# Patient Record
(demographics unavailable — no encounter records)

---

## 2018-03-18 NOTE — XMS REPORT
Patient Summary Document

 Created on: 2018



MARIELA BISHOP

External Reference #: [id number]

: 1943

Sex: Female



Demographics







 Address  [address] [place] [address]  [address]

 

 Home Phone  [phone]

 

 Preferred Language  Unknown

 

 Marital Status  Unknown

 

 Christian Affiliation  Unknown

 

 Race  Unknown

 

 Additional Race(s) 

 

 

 Ethnic Group  Unknown





Author







 Author  [facility]

 

 Address  Unknown

 

 Phone  Unavailable







Care Team Providers







 Care Team Member Name  Role  Phone

 

 AJAY DELEON  Unavailable  Unavailable

 

 REYES-GUERRERO, EDNA  Unavailable  Unavailable







Problems

This patient has no known problems.



Allergies, Adverse Reactions, Alerts

This patient has no known allergies or adverse reactions.



Medications

This patient has no known medications.



Results







 Test Description  Test Time  Test Comments  Text Results  Atomic Results  
Result Comments









 ELECTROLYTES  2018 16:35:00       









   

 

 SODIUM (BEAKER) (test code=381)  137 meq/L  136-145   

 

 POTASSIUM (BEAKER) (test code=379)  4.1 meq/L  3.5-5.1  Specimen slightly 
hemolyzed

 

 CHLORIDE (BEAKER) (test code=382)  107 meq/L     

 

 CO2 (BEAKER) (test code=355)  24 meq/L  22-29   





[id number] 16:35:00* 





 Test Item  Value  Reference Range  Comments

 

 GLUCOSE RANDOM (BEAKER) (test code=652)  134 mg/dL     





BUN AND [id number] 16:35:00* 





 Test Item  Value  Reference Range  Comments

 

 BLOOD UREA NITROGEN (BEAKER) (test code=354)  19 mg/dL  7-21   

 

 CREATININE (BEAKER) (test code=358)  0.68 mg/dL  0.57-1.25  Specimen slightly 
hemolyzed

 

 EGFR (BEAKER) (test code=1092)  85 mL/min/1.73 sq m     ESTIMATED GFR IS NOT 
AS ACCURATE AS CREATININE CLEARANCE IN PREDICTING GLOMERULAR FILTRATION RATE. 
ESTIMATED GFR IS NOT APPLICABLE FOR DIALYSIS PATIENTS.





[id number] 16:18:00* 





 Test Item  Value  Reference Range  Comments

 

 HEMOGLOBIN (BEAKER) (test code=410)  12.8 GM/DL  11.2-15.7   





CHEST 2 VIEWS    [facility]   [address] [place], [place] [address]      Patient Name: MARIELA BISHOP   MR #
: [id number]    : 1943 Age/Sex: 74/F  Acct #: [id number] Re #: [id number]
[id number]  [facility] Physician:     Ordered by: REYES-GUERRERO, EDNA DPM  Report #: [id number]
[id number]   Location: OR  Room/Bed:     ____________________________________________
_______________________________________________________    Procedure: [id number] 
DX/CHEST 2 VIEWS  Exam Date: 17                            Exam Time: 
1549       REPORT STATUS: Signed    PROCEDURE: X-RAY CHEST, TWO VIEWS   
COMPARISON: [facility], DX, CHEST 2 VIEWS, 2016,    13:11.   
INDICATIONS: PRE-OP FOOT SURGERY TOMORROW       FINDINGS:         LUNGS: No 
consolidations or edema. The right hemidiaphragm remains    elevated secondary 
to focal eventration anteriorly.          PLEURA: No effusions or pneumothorax. 
Thickening of the minor fissure.       HEART  T     MEDIASTINUM: The heart is 
within normal size-limits. Tortuous thoracic    aorta.       BONES  T    SOFT 
TISSUES:  No acute findings.           CONCLUSION:      No acute thoracic 
abnormality.                Yun Lee D.O.     Dictated by:  Yun Lee D.O. on 2017 at 17:50        Electronically approved by:  Yun Lee D.O. on 2017 at 17:50                   Dictated By: YUN LEE DO  
Electronically Signed By: YUN LEE DO on 17  Transcribed By: 
CHARISSE on 17       COPY TO:   REYES-GUERRERO,EDNA DPM

## 2018-03-18 NOTE — XMS REPORT
Clinical Summary

 Created on: 2018



Mariela Bishop

External Reference #: [id number]

: 1943

Sex: Female



Demographics







 Address  [address] [place]  [address]

 

 Home Phone  [phone]

 

 Preferred Language  Unknown

 

 Marital Status  

 

 Orthodoxy Affiliation  Unknown

 

 Race  White

 

 Ethnic Group  /Latin





Author







 Author  [facility]

 

 Organization  [facility]

 

 Address  Unknown

 

 Phone  Unavailable







Support







 Name  Relationship  Address  Phone

 

 Stevan Bishop  [employer]  [address] [place]  [address]  [phone]







Care Team Providers







 Care Team Member Name  Role  Phone

 

 Asked, Given  PCP  Unavailable







Allergies

No Known Allergies



Current Medications







      



  Prescription   Sig.   Disp.   Refills   Start   End Date   Status



      Date  

 

      



  azithromycin (ZITHROMAX)      20    Active



  250 MG tablet      17  

 

      



  YUVAFEM 10 mcg vaginal      05/10/20    Active



  tablet      17  

 

      



  levothyroxine (SYNTHROID,   TOME 1 TABLETA TODOS LOS    1   20    Active



  LEVOXYL) 112 mcg tablet   VICTOR     17  

 

      



  omeprazole (PriLOSEC) 40      20    Active



  MG capsule      17  

 

      



  terconazole (TERAZOL 7)      20    Active



  0.4 % vaginal cream      17  

 

      



  nitrofurantoin,   Take 100 mg by mouth 2       Active



  macrocrystal-monohydrate,   (two) times a day.     



  (MACROBID) 100 MG capsule      

 

      



  meloxicam (MOBIC) 15 mg      10/19/20    Active



  tablet      17  

 

      



  nitrofurantoin      10/09/20    Active



  (MACRODANTIN) 100 MG      17  



  capsule      

 

      



  triamcinolone (KENALOG)      10/25/20    Active



  0.1 % cream      17  

 

      



  ciprofloxacin (CIPRO) 500   Take 1 tablet (500 mg   14 tablet   0   20   



  MG tablet   total) by mouth 2 (two)     17   17 



   times a day for 7 days.     

 

      



  cefuroxime (CEFTIN) 500   Take 1 tablet (500 mg   14 tablet   0   20   



  MG tabletIndications:   total) by mouth 2 (two)     17   17 



  Acute cystitis without   times a day for 7 days.     



  hematuria      

 

      



  nitrofurantoin,   Take 1 capsule (100 mg   20 capsule   0   20   



  macrocrystal-monohydrate,   total) by mouth 2 (two)     17   17 



  (MACROBID) 100 MG   times a day for 10 days.     



  capsuleIndications: Acute      



  cystitis without      



  hematuria      

 

      



  minocycline (MINOCIN) 100   Take 1 capsule (100 mg   20 capsule   0   20   



  MG capsule   total) by mouth 2 (two)     18   18 



   times a day for 10 days.     

 

      



  nitrofurantoin,   Take 1 capsule (100 mg   14 capsule   0   20   



  macrocrystal-monohydrate,   total) by mouth 2 (two)     18   18 



  (MACROBID) 100 MG   times a day for 7 days.     



  capsuleIndications:      



  Urinary tract infection      



  without hematuria, site      



  unspecified      







Active Problems





Not on file



Encounters







    



  Date   Type   Specialty   Care Team   Description

 

    



  2018   Orders Only   Urology   Shelli Hwang MA   Urinary tract 
infection



      without hematuria, site



      unspecified (Primary Dx)

 

    



  2018   Telephone   Urology   Quinton Goldberg MD 

 

    



  2018   Orders Only   Urology   Shelli Hwang MA   Urinary tract 
infection



      without hematuria, site



      unspecified (Primary Dx)

 

    



  2018   Telephone   Urology   Quinton Goldberg MD 

 

    



  2018   Telephone   Urology   Shelli Hwang MA 

 

    



  2018   Telephone   Urology   Jessica Stone MA 

 

    



  2018   Clinical   Urology   Truong Das MD   Urinary tract 
infection



   Support     without hematuria, site



      unspecified (Primary Dx)

 

    



  2018   Telephone   Urology   Quinton Goldberg MD 

 

    



  2017   Telephone   Urology   Quinton Goldberg MD 

 

    



  2017   Office Visit   Quinton Vega   Urinary tract 
infection



     MD COY   without hematuria, site



      unspecified (Primary Dx)

 

    



  2017   Telephone   UrologQuinton Pizano   Urinary tract 
infection



     MD COY   without hematuria, site



      unspecified (Primary Dx)

 

    



  10/09/2017   Procedure visit   UrologQuinton Pizano   Urinary 
tract infection



     MD COY   without hematuria, site



      unspecified (Primary Dx)

 

    



  10/04/2017   Telephone   Quinton Vega MD 

 

    



  2017   Orders Only   Urology   Shelli Hwang MA   Acute cystitis 
without



      hematuria (Primary Dx)

 

    



  09/15/2017   Telephone   Urology   Therese Alcantara MA   Urinary tract 
infection,



      site unspecified (Primary



      Dx)

 

    



  2017   Telephone   Urology   Quinton Goldberg MD 

 

    



  2017   Telephone   Urology   Quinton Goldberg MD 

 

    



  2017   Telephone   Urology   Quinton Goldberg MD 

 

    



  2017   Office Visit   Urology   Quinton Goldberg   Recurrent UTI 
(Primary



     N., MD   Dx)

 

    



  2017   Telephone   Urology   Therese Alcantara MA   Urinary tract 
infection,



      site unspecified (Primary



      Dx)

 

    



  2017   Office Visit   Urology   Quinton Goldberg   Recurrent UTI 
(Primary



     NTeresa, MD   Dx);



      Atrophic vaginitis

 

    



  2017   Telephone   Urology   Quinton Goldberg   Urinary tract 
infection,



     MD COY   site unspecified (Primary



      Dx)

 

    



  2017   Telephone   Urology   Shelli Hwang MA 

 

    



  2017   Orders Only   Urology   Shelli Hwang MA   Acute cystitis 
without



      hematuria (Primary Dx)

 

    



  2017   Orders Only   Urology   Quinton Goldberg MD 

 

    



  2017   Telephone   Urology   Therese Alcantara MA 

 

    



  2017   Clinical   Urology   Quinton Goldberg   Urinary tract 
infection,



   Support    MD COY   site unspecified (Primary



      Dx)



after 2017



Family History







   



  Relation   Name   Status   Comments

 

   



  Father     

 

   



  Mother     







Social History







    



  Tobacco Use   Types   Packs/Day   Years Used   Date

 

    



  Never Smoker    









   



  Alcohol Use   Drinks/Week   oz/Week   Comments

 

   



  No   









 



  Sex Assigned at Birth   Date Recorded

 

 



  Not on file 







Last Filed Vital Signs

Not on file



Plan of Treatment







   



  Health Maintenance   Due Date   Last Done   Comments

 

   



  COLONOSCOPY   1993  

 

   



  MAMMOGRAM   1993  

 

   



  ZOSTER VACCINE   2003  

 

   



  PNEUMOCOCCAL   2008  



  POLYSACCHARIDE VACCINE   



  AGE 65 AND OVER   

 

   



  PNEUMOCOCCAL-13   2008  

 

   



  INFLUENZA VACCINE   2017  







Results

* Urine culture (2018  1:26 PM)



Only the most recent of 7 results within the time period is included.





  



  Component   Value   Ref Range

 

  



  Urine culture   No growth 









 



  Specimen   Performing Laboratory

 

 



  Urine   [facility]









 Narrative

 

 



Performed at:[id number]



: Prateek Kerr MD, Phone:[phone]





* POC urinalysis dipstick (2017  2:25 PM)



Only the most recent of 3 results within the time period is included.





  



  Component   Value   Ref Range

 

  



  Color urine, POC   Yellow 

 

  



  Clarity urine, POC   Clear 

 

  



  Glucose urine, POC   Negative   Negative

 

  



  Bilirubin urine, POC   Negative   Negative

 

  



  Ketones urine, POC   Negative   Negative

 

  



  Specific gravity urine,   1.025   1.005 - 1.030



  POC  

 

  



  Blood urine, POC   Negative   Negative

 

  



  pH urine, POC   5.5   5.0, 5.5, 6.0, 6.5, 7.0,



    7.5, 8.0, 8.5

 

  



  Protein urine, POC   Negative   Negative

 

  



  Urobilinogen urine, POC   <2.0   <2.0

 

  



  Nitrite urine, POC   Negative   Negative

 

  



  Leukocyte esterase urine,   Negative   Negative



  POC  









 



  Specimen   Performing Laboratory

 

 



  Urine 





* POC uroflowmetry (2017  3:40 PM)





  



  Component   Value   Ref Range

 

  



  Flow rate   215   ml/sec









 



  Specimen   Performing Laboratory

 

 



  Urine 









 Impressions

 

 



Peak Flow: 9 ml/s



Mean Flow; 7 ml/s



Voiding Time: 29 sec



Flow Time: 27 sec



Time to Max Flow: 7 sec



Voided VOlume: 215 ml





* US Renal (2017  3:18 PM)





 



  Specimen   Performing Laboratory

 

 



   [facility]



   [address] [place] [address]









 Narrative

 

 



Renal Ultrasound:



 



Bilateral renal Ultrasound is performed in Axial and longitudinal 



orientation.



Diagnosis:UTI



 



Right Kidney:



 



Size: Length: 10.1 cm. X AP diameter: 4.3 cm. X Transverse 4.4 cm.



Echogenicity: Normal



Atrophy: No



Cyst:No



Mass:No



Stone:No



Hydronephrosis:noneGrade:



 



 



 



 



Left Kidney:



 



Size: Length: 10.9 cm. X AP diameter: 4.9 cm. X Transverse 4.9 cm.



Echogenicity: Normal



Atrophy: No



Cyst:No



Mass:No



Stone:No



Hydronephrosis:none



Grade:



 



 



 



 



 



 



Comments:



Bilateral renal sonogram is unremarkable in this study. 





* Urine culture screen result (2017 11:01 AM)





  



  Component   Value   Ref Range

 

  



  Urine culture   Klebsiella pneumoniae 



   Greater than 100,000 colony forming units per mL 



   (A) 









 



  Specimen   Performing Laboratory

 

 



   [facility]









 Narrative

 

 



Performed at:[id number]



: Prateek Kerr MD, Phone:[phone]



Specimen Comment: A duplicate report has been generated due to demographic 
updates.









   



  Organism   Antibiotic   Method   Susceptibility

 

   



  Klebsiella pneumoniae   Amoxicillin/Clavulanate    R: Resistant

 

   



  Klebsiella pneumoniae   Ampicillin    R: Resistant

 

   



  Klebsiella pneumoniae   Cefepime    S: Susceptible

 

   



  Klebsiella pneumoniae   Ceftriaxone    S: Susceptible

 

   



  Klebsiella pneumoniae   Cefuroxime    S: Susceptible

 

   



  Klebsiella pneumoniae   Cephalothin    S: Susceptible

 

   



  Klebsiella pneumoniae   Ciprofloxacin    R: Resistant

 

   



  Klebsiella pneumoniae   Ertapenem    S: Susceptible

 

   



  Klebsiella pneumoniae   Gentamicin    R: Resistant

 

   



  Klebsiella pneumoniae   Imipenem    S: Susceptible

 

   



  Klebsiella pneumoniae   Levofloxacin    R: Resistant

 

   



  Klebsiella pneumoniae   Nitrofurantoin    I: Intermediate

 

   



  Klebsiella pneumoniae   Piperacillin    R: Resistant

 

   



  Klebsiella pneumoniae   Tetracycline    R: Resistant

 

   



  Klebsiella pneumoniae   Tobramycin    R: Resistant

 

   



  Klebsiella pneumoniae   Trimethoprim/Sulfamethoxa    R: Resistant



   zole  

 

 



  Comment:



  Performed at:     -



  [facility]    [id number]



  : Prateek Kerr MD, Phone:    [phone]





after 2017



Insurance







     



  Payer   Benefit   Subscriber ID   Type   Phone   Address



   Plan /    



   Group    

 

     



  MEDICARE   MEDICARE   xxxxxxxxxx   Medicare [place]



   PART A AND    



   B    

 

     



  BCBS   BCBS   xxxxxxxxxxxx   PPO  



   CHOICE    



   PPO/[employer] PPO    









     



  Guarantor Name   Account   Relation to   Date of   Phone   Billing Address



   Type   Patient   Birth  

 

     



  MARIELA BISHOP   Personal/F   Self   1943   Home:   [address] [place] [address]     [phone]   [place] [address]

## 2018-06-02 NOTE — XMS REPORT
Clinical Summary

 Created on: 2018



Wilda De La Cruz

External Reference #: [id number]

: 1943

Sex: Female



Demographics







 Address  [address]  [address]

 

 Home Phone  [phone]

 

 Preferred Language  Unknown

 

 Marital Status  Unknown

 

 Judaism Affiliation  [employer]

 

 Race  Unknown

 

 Ethnic Group  /Latin





Author







 Author  G[facility]ZALO [facility]

 

 Address  Unknown

 

 Phone  Unavailable







Support







 Name  Relationship  Address  Phone

 

 , Stevan De La Cruz  [employer]  [address] [place] [place]  [address]  [phone]

 

 , Arlyn Alvarez  [employer]  Unknown  [phone]







Care Team Providers







 Care Team Member Name  Role  Phone

 

  PCP  Unavailable







Allergies







    



  Active Allergy   Reactions   Severity   Noted Date   Comments

 

    



  Sulfa (Sulfonamide     2018   confused



  Antibiotics)    

 

    



  Pentazocine Lactate     2018   confused

 

    



  Hydrocodone-Acetaminophen     2018   confused

 

    



  Penicillins   Rash   Low   2018 







Current Medications







      



  Prescription   Sig.   Disp.   Refills   Start   End Date   Status



      Date  

 

      



  levothyroxine (SYNTHROID,   Take 112 mcg by mouth       Active



  LEVOTHROID) 112 MCG   Every morning on an empty     



  tablet   stomach.     

 

      



  mirabegron (MYRBETRIQ) 25   Take by mouth daily.       Active



  mg Tb24 ER tablet      

 

      



  omeprazole (PRILOSEC) 40   Take 40 mg by mouth       Active



  MG capsule   daily.     

 

      



  aspirin 81 MG EC tablet   Take 81 mg by mouth       Active



   daily.     

 

      



  ascorbic acid, vitamin C,   Take 1,000 mg by mouth       Active



  (VITAMIN C) 1000 MG   daily.     



  tablet      

 

      



  b complex vitamins tablet   Take 1 tablet by mouth       Active



   daily.     

 

      



  calcium carbonate   Take 1 tablet by mouth       Active



  (OS-PIA) 500 mg tablet   daily.     







Active Problems







 



  Problem   Noted Date

 

 



  Osteoarthritis of right subtalar joint   2018

 

 



  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







Encounters







    



  Date   Type   Specialty   Care Team   Description

 

    



  2018   Telephone   Anesthesiology   Winkelmann, Jacqueline   Follow-up



     LUPIS Reagan 

 

    



  2018   Telephone   Anesthesiology   Winkelmann, Jacqueline   Follow-up



     LUPIS Reagan 

 

    



  2018   [facility]    Star Silver MD 



   Encounter   

 

    



  2018   Procedure Pass   

 

    



  2018   Surgery    Star Silver MD   ARTHRODESIS,FOOT

 

    



  2018   Anesthesia    Kel Mariano Event MD 

 

    



  2018   Hospital   Pre-Admission Testing   Star Silver MD 



   Encounter   

 

    



  2018   Orders Only   General Internal Medicine  



after 2017



Social History







    



  Tobacco Use   Types   Packs/Day   Years Used   Date

 

    



  Never Smoker    

 

    



  Smokeless Tobacco: Never   



  Used   









   



  Alcohol Use   Drinks/Week   oz/Week   Comments

 

   



  No   









 



  Sex Assigned at Birth   Date Recorded

 

 



  Not on file 







Last Filed Vital Signs







  



  Vital Sign   Reading   Time Taken

 

  



  Blood Pressure   100/47   2018  4:30 PM CDT

 

  



  Pulse   67   2018  4:30 PM CDT

 

  



  Temperature   36.7   C (98   F)   2018  4:30 PM CDT

 

  



  Respiratory Rate   18   2018  4:30 PM CDT

 

  



  Oxygen Saturation   93%   2018  4:30 PM CDT

 

  



  Inhaled Oxygen   -   -



  Concentration  

 

  



  Weight   100.7 kg (221 lb 14.4 oz)   2018  9:01 AM CDT

 

  



  Height   167.6 cm (5' 6")   2018  9:01 AM CDT

 

  



  Body Mass Index   35.82   2018  9:01 AM CDT







Plan of Treatment





Not on file



Implants







      



  Implanted   Type   Area      Device   Expiration   Model /



      Identifier   Date   Serial /



        Lot

 

      



  Scr Lowprf 6.7x80mm Ti [id number]   [place]/Art   Right:   ARTHREX     AR-
8967-18



  - [id number]   hroscopy   Foot      80 /



  Implanted: Qty: 1 on 2018 by        /



  Star Silver MD      

 

      



  Scr W 60mm [id number] - [id number]   [place]/Art   Right:   ARTHREX     AR-
8967-28



  Implanted: Qty: 1 on 2018 by   hroscopy   Foot      60 /



  Star Silver MD        /

 

      



  Bone Grft Dmi Dbx Pty 1ml [id number] -   Bone   Right:   MUSCULOSKELETAL    10/01
/2018   [id number] /



  [id number]    Foot   TRANSPLANT FND     [id number]



  Implanted: Qty: 1 on 2018 by        [id number] /



  Star Silver MD      

 

      



  Tiss Bngr Efrain Can Strut 6cm   Bone   Right:   MUSCULOSKELETAL    2020 
  [id number] /



  [id number] - [id number]    Foot   TRANSPLANT FND     [id number]



  Implanted: Qty: 1 on 2018 by        [id number] /



  Star Silver MD      

 

      



  Scr Zulema 3.5x30mm Ar-8935l-30 -   Fracture/F   Right:   ARTHREX     AR-8935L-3



  [id number]   ixation   Foot      0 /



  Implanted: Qty: 1 on 2018        /

 

      



  Scr Ti  Low Pro Zulema 3.5x16mm   Fracture/F   Right:   ARTHREX     AR-8935L-1



  Ar-8935l-16 - [id number]   ixation   Foot      6 /



  Implanted: Qty: 1 on 2018 by        /



  Star Silver MD      

 

      



  Scr Zulema 28mm Ar-8935l-28 -   Fracture/F   Right:   ARTHREX     AR-8935L-2



  [id number]   ixation   Foot      8 /



  Implanted: Qty: 1 on 2018 by        /



  Star Silver MD      

 

      



  Plt Medial 3.5mm Sm R [id number]   Fracture/F   Right:   ARTHREX     AR-
8952MC-



  - [id number]   ixation   Foot      SR /



  Implanted: Qty: 1 on 2018 by        /



  Star Silver MD        [id number]

 

      



  Scr Non Zulema Lp 3.5x20mm Ti   Fracture/F   Right:   ARTHREX     AR-8935-20



  Ar-8935-20 - [id number]   ixation   Foot      /



  Implanted: Qty: 1 on 2018 by        /



  Star Silver MD      

 

      



  Scr Non Zulema Lp 3.5x36mm Ti   Fracture/F   Right:   ARTHREX     AR-8935-36



  Ar-8935-36 - [id number]   ixation   Foot      /



  Implanted: Qty: 1 on 2018 by        /



  Star Silver MD      

 

      



  Scr Non Zulema Lp 3.5x24mm Ti   Fracture/F   Right:   ARTHREX     AR-8935-24



  Ar-8935-24 - [id number]   ixation   Foot      /



  Implanted: Qty: 1 on 2018 by        /



  Star Silver MD      

 

      



  Scr Ti  Low Pro Zulema 3.5x20mm   Fracture/F   Right:   ARTHREX     AR-8935L-2



  Ar-8935l-20 - [id number]   ixation   Foot      0 /



  Implanted: Qty: 1 on 2018 by        /



  Star Silver MD      









      



  Explanted   Type   Area      Device   Expiration   Model /



      Identifier   Date   Serial /



        Lot

 

      



  Scr Zulema 3.5x30mm Ar-8935l-30 -   Fracture/F   Right:   ARTHREX     AR-8935L-3



  [id number]   ixation   Foot      0 /



  Explanted: Qty: 1 on 2018        /

 

      



  Scr Non Zulema Lp 3.5x18mm Ti   Fracture/F   Right:   ARTHREX     AR-8935-18



  Ar-8935-18 - [id number]   ixation   Foot      /



  Explanted: Qty: 1 on 2018 by        /



  Star Silver MD      







Procedures







    



  Procedure Name   Priority   Date/Time   Associated Diagnosis   Comments

 

    



  PROCEDURE W/ C-ARM    2018   DJD (degenerative joint 



    10:15 AM CDT   disease), ankle and foot, 



     right 

 

  



   Special



   Needs



   (GENERAL



   W/ PREOP



   CONTINUOUS



   BLOCK,



   C-ARM/TECH



   , ARTHREX



   CANNULATED



   SCREWS,



   MID FOOT



   PLATE)

 

    



  ARTHRODESIS,FOOT    2018   DJD (degenerative joint 



    10:15 AM CDT   disease), ankle and foot, 



     right 

 

  



   Special



   Needs



   (GENERAL



   W/ PREOP



   CONTINUOUS



   BLOCK,



   C-ARM/TECH



   , ARTHREX



   CANNULATED



   SCREWS,



   MID FOOT



   PLATE)



after 2017



Results

* FL rad tech in or 30 minute increments (2018 12:09 PM)





 



  Specimen   Performing Laboratory

 

 



   GE [facility]









 Narrative

 

 



FINAL REPORT



 



PATIENT ID: [id number]



 



Intraoperative fluoroscopy.



 



CLINICAL HISTORY: right subtalar arthritis.



 



FINDINGS: Six fluoroscopically acquired images were acquired by the 



referring physician.



 



An intraoperative verbal report was not requested. Fluoroscopy was 



not performed by the undersigned.



 



Fluoroscopy time: 14 seconds. Six images.



 



Signed: Tab Guzmán MD



Report Verified Date/Time:2018 13:31:32



 



Reading Location: [address] Radiology Reading Room



Electronically signed by: TAB GUZMÁN M.D. on 2018 01:31 
PM



 









 Procedure Note

 

 



Interface, External Ris In - 2018  1:33 PM CDT



FINAL REPORT

 

PATIENT ID:   [id number]

 

Intraoperative fluoroscopy.

 

CLINICAL HISTORY: right subtalar arthritis.

 

FINDINGS: Six fluoroscopically acquired images were acquired by the 

referring physician.

 

An intraoperative verbal report was not requested. Fluoroscopy was 

not performed by the undersigned.

 

Fluoroscopy time: 14 seconds. Six images.

 

Signed: Tab Guzmán MD

Report Verified Date/Time:  2018 13:31:32

 

Reading Location: [address] Radiology Reading Room

      Electronically signed by: TAB GUZMÁN M.D. on 2018 01:31 PM

 





* ANESTHESIA PERIPHERAL BLOCK (2018 10:14 AM)



Only the most recent of 2 results within the time period is included.





 Narrative

 

 



Vitaliy Astudillo MD 3/21/2018 10:14 AM



Peripheral Block



 



Patient location during procedure: pre-op



Start time: 3/21/2018 9:43 AM



End time: 3/21/2018 9:52 AM



Reason for block: procedure for pain, at surgeon's request and post-op 



pain management



Staffing



Anesthesiologist: VITALIY ASTUDILLO



Performed by: anesthesiologist 



Preanesthetic Checklist



Completed: patient identified, site marked, surgical consent, pre-op 



evaluation, timeout performed, IV checked, risks and benefits discussed 



and monitors and equipment checked



Peripheral Block



Patient position: supine



Prep: ChloraPrep and site prepped and draped



Patient monitoring: heart rate, cardiac monitor and continuous pulse ox



Block type: Adductor canal



Laterality: right



Injection technique: single-shot



Procedures: ultrasound guided and landmark technique



Local infiltration: ropivicaine



Infiltration strength: 0.5 %



Dose: 20 mL



 



Needle



Needle type: pajunk nanoline. 



Needle gauge: 21 G



Needle length: 100 mm



Test dose: negative



Assessment



Injection assessment: negative aspiration for heme, no paresthesia on 



injection, incremental injection and local visualized surrounding nerve on 



ultrasound



Paresthesia pain: none



Heart rate change: no



Slow fractionated injection: yes



Additional Notes



Patient tolerated well.No pain on injection or throughout procedure.



 



 









 Procedure Note

 

 



Vitaliy Astudillo MD - 2018 10:13 AM CDT



Peripheral Block



Patient location during procedure: pre-op

Start time: 3/21/2018 9:43 AM

End time: 3/21/2018 9:52 AM

Reason for block: procedure for pain, at surgeon's request and post-op pain 
management

Staffing

Anesthesiologist: VITALIY ASTUDILLO

Performed by: anesthesiologist 

Preanesthetic Checklist

Completed: patient identified, site marked, surgical consent, pre-op evaluation
, timeout performed, IV checked, risks and benefits discussed and monitors and 
equipment checked

Peripheral Block

Patient position: supine

Prep: ChloraPrep and site prepped and draped

Patient monitoring: heart rate, cardiac monitor and continuous pulse ox

Block type: Adductor canal

Laterality: right

Injection technique: single-shot

Procedures: ultrasound guided and landmark technique

Local infiltration: ropivicaine

Infiltration strength: 0.5 %

Dose: 20 mL



Needle

Needle type: pajunk nanoline. 

Needle gauge: 21 G

Needle length: 100 mm

Test dose: negative

Assessment

Injection assessment: negative aspiration for heme, no paresthesia on injection
, incremental injection and local visualized surrounding nerve on ultrasound

Paresthesia pain: none

Heart rate change: no

Slow fractionated injection: yes

Additional Notes

Patient tolerated well.  No pain on injection or throughout procedure.  















* BUN and Creatinine (2018  3:41 PM)





  



  Component   Value   Ref Range

 

  



  BUN   19   7 - 21 mg/dL

 

  



  Creatinine   0.68Comment: Specimen slightly hemolyzed   0.57 - 1.25 mg/dL

 

  



  EGFR   85Comment: ESTIMATED GFR IS NOT AS ACCURATE AS   mL/min/1.73 sq m



   CREATININE CLEARANCE IN PREDICTING GLOMERULAR 



   FILTRATION RATE. ESTIMATED GFR IS NOT APPLICABLE 



   FOR DIALYSIS PATIENTS. 









 



  Specimen   Performing Laboratory

 

 



  Blood   [address] [address]





* Hemoglobin (2018  3:41 PM)





  



  Component   Value   Ref Range

 

  



  Hemoglobin   12.8   11.2 - 15.7 GM/DL









 



  Specimen   Performing Laboratory

 

 



  Blood   [address] [address]





* Glucose (2018  3:41 PM)





  



  Component   Value   Ref Range

 

  



  Glucose   134 (H)   70 - 105 mg/dL









 



  Specimen   Performing Laboratory

 

 



  [address] [address]





* Electrolytes (2018  3:41 PM)





  



  Component   Value   Ref Range

 

  



  Sodium   137   136 - 145 meq/L

 

  



  Potassium   4.1Comment: Specimen slightly hemolyzed   3.5 - 5.1 meq/L

 

  



  Chloride   107   98 - 107 meq/L

 

  



  CO2   24   22 - 29 meq/L









 



  Specimen   Performing Laboratory

 

 



  Blood   [address] [address]





* ECG 12 lead (2018  2:41 PM)





 



  Specimen   Performing Laboratory

 

 



   GE MUSE









 Narrative

 

 



Ventricular Rate 64 BPM



Atrial Rate 64 BPM



P-R Interval 186 ms



QRS Duration 92 ms



Q-T Interval 438 ms



QTC Calculation(Bazett) 451 ms



P Axis 51 degrees



R Axis -10 degrees



T Axis 62 degrees



 



Normal sinus rhythm



Normal ECG



When compared with ECG of 11-OCT-2005 12:54,



No significant change was found



Confirmed by MD MURRAY YOCHAI () on 3/13/2018 6:40:01 AM









 Procedure Note

 

 



Interface, External Ris In - 2018  6:40 AM CDT



Ventricular Rate 64 BPM

Atrial Rate 64 BPM

P-R Interval 186 ms

QRS Duration 92 ms

Q-T Interval 438 ms

QTC Calculation(Bazett) 451 ms

P Axis 51 degrees

R Axis -10 degrees

T Axis 62 degrees



Normal sinus rhythm

Normal ECG

When compared with ECG of 11-OCT-2005 12:54,

No significant change was found

Confirmed by MD MURRAY YOCHAI () on 3/13/2018 6:40:01 AM





after 2017

## 2018-06-02 NOTE — XMS REPORT
Clinical Summary

 Created on: 2018



Mariela Bishop

External Reference #: [id number]

: 1943

Sex: Female



Demographics







 Address  [address] [place]  [address]

 

 Home Phone  [phone]

 

 Preferred Language  Unknown

 

 Marital Status  

 

 [employer] Affiliation  Unknown

 

 Race  White

 

 Ethnic Group  /Latin





Author







 Author  [facility]

 

 Organization  [facility]

 

 Address  Unknown

 

 Phone  Unavailable







Support







 Name  Relationship  Address  Phone

 

 Stevan Bishop  [employer]  [address] [place]  [address]  [phone]







Care Team Providers







 Care Team Member Name  Role  Phone

 

 Asked, Given  PCP  Unavailable







Allergies

No Known Allergies



Current Medications







      



  Prescription   Sig.   Disp.   Refills   Start   End Date   Status



      Date  

 

      



  azithromycin (ZITHROMAX)      20    Active



  250 MG tablet      17  

 

      



  YUVAFEM 10 mcg vaginal      05/10/20    Active



  tablet      17  

 

      



  levothyroxine (SYNTHROID,   TOME 1 TABLETA TODOS LOS    1   20    Active



  LEVOXYL) 112 mcg tablet   VICTOR     17  

 

      



  omeprazole (PriLOSEC) 40      20    Active



  MG capsule      17  

 

      



  terconazole (TERAZOL 7)      20    Active



  0.4 % vaginal cream      17  

 

      



  nitrofurantoin,   Take 100 mg by mouth 2       Active



  macrocrystal-monohydrate,   (two) times a day.     



  (MACROBID) 100 MG capsule      

 

      



  meloxicam (MOBIC) 15 mg      10/19/20    Active



  tablet      17  

 

      



  nitrofurantoin      10/09/20    Active



  (MACRODANTIN) 100 MG      17  



  capsule      

 

      



  triamcinolone (KENALOG)      10/25/20    Active



  0.1 % cream      17  

 

      



  cefuroxime (CEFTIN) 500   Take 1 tablet (500 mg   14 tablet   0   20   



  MG tabletIndications:   total) by mouth 2 (two)     17   17 



  Acute cystitis without   times a day for 7 days.     



  hematuria      

 

      



  nitrofurantoin,   Take 1 capsule (100 mg   20 capsule   0   20   



  macrocrystal-monohydrate,   total) by mouth 2 (two)     17   17 



  (MACROBID) 100 MG   times a day for 10 days.     



  capsuleIndications: Acute      



  cystitis without      



  hematuria      

 

      



  minocycline (MINOCIN) 100   Take 1 capsule (100 mg   20 capsule   0   20   



  MG capsule   total) by mouth 2 (two)     18   18 



   times a day for 10 days.     

 

      



  nitrofurantoin,   Take 1 capsule (100 mg   14 capsule   0   20   



  macrocrystal-monohydrate,   total) by mouth 2 (two)     18   18 



  (MACROBID) 100 MG   times a day for 7 days.     



  capsuleIndications:      



  Urinary tract infection      



  without hematuria, site      



  unspecified      

 

      



  sulfamethoxazole-trimetho   Take 1 tablet by mouth 2   20 tablet   0   20   



  prim (BACTRIM DS) 800-160   (two) times a day for 10     18   18 



  mg per tabletIndications:   days.     



  Urinary tract infection      



  without hematuria, site      



  unspecified      

 

      



  nitrofurantoin   Take 1 capsule (100 mg   21 capsule   0   20   



  (MACRODANTIN) 100 MG   total) by mouth 3 (three)     18   18 



  capsuleIndications:   times a day for 7 days.     



  Urinary tract infection      



  without hematuria, site      



  unspecified      

 

      



  fluconazole (DIFLUCAN)   Take 1 tablet (100 mg   5 tablet   0   20   



  100 MG tabletIndications:   total) by mouth daily for     18   18 



  Urinary tract infection   5 days.     



  without hematuria, site      



  unspecified      

 

      



  cefuroxime (CEFTIN) 250   Take 1 tablet (250 mg   10 tablet   0   20   



  MG tabletIndications:   total) by mouth 2 (two)     18   18 



  Urinary tract infection   times a day for 5 days.     



  without hematuria, site      



  unspecified      

 

      



  fluconazole (DIFLUCAN)   Take 1.5 tablets (150 mg   5 tablet   0   20   



  100 MG tabletIndications:   total) by mouth daily for     18   18 



  Yeast infection   3 days.     







Active Problems





Not on file



Encounters







    



  Date   Type   Specialty   Care Team   Description

 

    



  2018   Orders Only   Urology   Shelli Hwang MA   Yeast infection (
Primary



      Dx)

 

    



  2018   Orders Only   Urology   Shelli Hwang MA   Urinary tract 
infection



      without hematuria, site



      unspecified (Primary Dx)

 

    



  2018   Telephone   Urology   Shelli Hwang MA 

 

    



  2018   Orders Only   Urology   Shelli Hwang MA   Urinary tract 
infection



      without hematuria, site



      unspecified (Primary Dx)

 

    



  2018   Orders Only   Urology   Hwang, Shelli, MA   Urinary tract 
infection



      without hematuria, site



      unspecified (Primary Dx)

 

    



  2018   Telephone   Urology   Quinton Goldberg MD 

 

    



  2018   Orders Only   Urology   Hwang, Shelli, MA   Urinary tract 
infection



      without hematuria, site



      unspecified (Primary Dx)

 

    



  2018   Orders Only   UrologQuinton Pizano MD 

 

    



  2018   Orders Only   Star Kitchen Jr., MD 

 

    



  2018   Telephone   Urology   Quinton Goldberg MD 

 

    



  2018   Orders Only   Urology   Hwang, Shelli, MA   Urinary tract 
infection



      without hematuria, site



      unspecified (Primary Dx)

 

    



  2018   Orders Only   Urology   Hwang, Shelli, MA   Urinary tract 
infection



      without hematuria, site



      unspecified (Primary Dx)

 

    



  2018   Telephone   Urology   Quinton Goldberg MD 

 

    



  2018   Orders Only   Urology   Hwang, Shelli, MA   Urinary tract 
infection



      without hematuria, site



      unspecified (Primary Dx)

 

    



  2018   Telephone   Urology   Quinton Goldberg MD 

 

    



  2018   Telephone   Urology   Shelli Hwang, MA 

 

    



  2018   Telephone   Urology   Jessica Stone MA 

 

    



  2018   Clinical   Urology   Truong Das MD   Urinary tract 
infection



   Support     without hematuria, site



      unspecified (Primary Dx)

 

    



  2018   Telephone   Urology   Quinton Goldberg MD 

 

    



  2017   Telephone   Urology   Quinton Goldberg MD 

 

    



  2017   Office Visit   Quinton Vega   Urinary tract 
infection



     MD COY   without hematuria, site



      unspecified (Primary Dx)

 

    



  2017   Telephone   Urology   Quinton Goldberg   Urinary tract 
infection



     MD COY   without hematuria, site



      unspecified (Primary Dx)

 

    



  10/09/2017   Procedure visit   Quinton Vega   Urinary 
tract infection



     MD COY   without hematuria, site



      unspecified (Primary Dx)

 

    



  10/04/2017   Telephone   UrologQiunton Pizano MD 

 

    



  2017   Orders Only   Urology   Hwang, Shelli, MA   Acute cystitis 
without



      hematuria (Primary Dx)

 

    



  09/15/2017   Telephone   Urology   Therese Alcantara MA   Urinary tract 
infection,



      site unspecified (Primary



      Dx)

 

    



  2017   Telephone   Urology   Quinton Goldberg MD 

 

    



  2017   Telephone   Urology   Quinton Goldberg MD 

 

    



  2017   Telephone   Urology   Quinton Goldberg MD 

 

    



  2017   Office Visit   Urology   Quinton Goldberg   Recurrent UTI 
(Primary



     N., MD   Dx)

 

    



  2017   Telephone   Urology   Therese Alcantara MA   Urinary tract 
infection,



      site unspecified (Primary



      Dx)

 

    



  2017   Office Visit   Urology   Quinton Goldberg   Recurrent UTI 
(Primary



     NTeresa, MD   Dx);



      Atrophic vaginitis

 

    



  2017   Telephone   Urology   Quinton Goldberg   Urinary tract 
infection,



     MD COY   site unspecified (Primary



      Dx)

 

    



  2017   Telephone   Urology   Shelli Hwang MA 

 

    



  2017   Orders Only   Urology   Shelli Hwang MA   Acute cystitis 
without



      hematuria (Primary Dx)

 

    



  2017   Orders Only   Urology   Quinton Goldberg MD 



after 2017



Family History







   



  Relation   Name   Status   Comments

 

   



  Father     

 

   



  Mother     







Social History







    



  Tobacco Use   Types   Packs/Day   Years Used   Date

 

    



  Never Smoker    









   



  Alcohol Use   Drinks/Week   oz/Week   Comments

 

   



  No   









 



  Sex Assigned at Birth   Date Recorded

 

 



  Not on file 







Last Filed Vital Signs

Not on file



Plan of Treatment







   



  Health Maintenance   Due Date   Last Done   Comments

 

   



  BREAST CANCER SCREENING   1993  

 

   



  COLON CANCER SCREENING   1993  

 

   



  SHINGRIX VACCINE (#1)   1993  

 

   



  ZOSTER VACCINE   2003  

 

   



  PNEUMOCOCCAL   2008  



  POLYSACCHARIDE VACCINE   



  AGE 65 AND OVER   

 

   



  PNEUMOCOCCAL-13   2008  

 

   



  INFLUENZA VACCINE   2018  







Results

* Urine culture (2018  3:42 PM)



Only the most recent of 8 results within the time period is included.





  



  Component   Value   Ref Range

 

  



  Urine culture   Escherichia coli 



   Greater than 100,000 colony forming units per mL 



   (A) 



   Comment: 



   Cefazolin <=4 ug/mL 



   Cefazolin with an TACHO <=16 predicts susceptibility 



   to the oral agents 



   cefaclor, cefdinir, cefpodoxime, cefprozil, 



   cefuroxime, cephalexin, 



   and loracarbef when used for therapy of 



   uncomplicated urinary tract 



   infections due to E. coli, Klebsiella pneumoniae, 



   and Proteus 



   mirabilis. 









 



  Specimen   Performing Laboratory

 

 



  Urine   [facility]









 Narrative

 

 



Performed at:[address] [place], [place] [id number]



: Prateek Kerr MD, Phone:[phone]









   



  Organism   Antibiotic   Method   Susceptibility

 

   



  Escherichia coli   Amoxicillin/Clavulanate    S ug/mL: Susceptible

 

   



  Escherichia coli   Ampicillin    R ug/mL: Resistant

 

   



  Escherichia coli   Cefepime    S ug/mL: Susceptible

 

   



  Escherichia coli   Ceftriaxone    S ug/mL: Susceptible

 

   



  Escherichia coli   Cefuroxime    S ug/mL: Susceptible

 

   



  Escherichia coli   Cephalothin    S ug/mL: Susceptible

 

   



  Escherichia coli   Ciprofloxacin    S ug/mL: Susceptible

 

   



  Escherichia coli   Ertapenem    S ug/mL: Susceptible

 

   



  Escherichia coli   Gentamicin    S ug/mL: Susceptible

 

   



  Escherichia coli   Imipenem    S ug/mL: Susceptible

 

   



  Escherichia coli   Levofloxacin    S ug/mL: Susceptible

 

   



  Escherichia coli   Nitrofurantoin    S ug/mL: Susceptible

 

   



  Escherichia coli   Piperacillin    R ug/mL: Resistant

 

   



  Escherichia coli   Tetracycline    R ug/mL: Resistant

 

   



  Escherichia coli   Tobramycin    S ug/mL: Susceptible

 

   



  Escherichia coli   Trimethoprim/Sulfamethoxa    R ug/mL: Resistant



   zole  

 

 



  Comment:



  Performed at:    [address] [place] [address]    [id number]



  : Prateek Kerr MD, Phone:    [phone]





* Organism identification, yeast (2018  4:27 PM)





  



  Component   Value   Ref Range

 

  



  Yeast ID   Candida albicans 

 

  



  Fasting status   N 









 



  Specimen   Performing Laboratory

 

 



   [facility]









 Narrative

 

 



Performed at:[address] [place], [place] [id number]



: Prateek Kerr MD, Phone:[phone]





* Aerobic Identification and Susceptibility (2018  4:27 PM)





  



  Component   Value   Ref Range

 

  



  Aerobe ID + suspect   Escherichia coli (A) 

 

  



  Aerobe ID + suspect   Enterococcus faecalis 



   Note: this isolate is vancomycin-susceptible. 



   (A) 



   Comment: 



   This information is provided for epidemiologic 



   purposes only: 



   vancomycin is not among the antibiotics 



   recommended for therapy 



   of urinary tract infections caused by 



   Enterococcus. 

 

  



  Aerobe ID + suspect   Yeast 



   isolated 



   (A)Comment: Identification to follow. 









 



  Specimen   Performing Laboratory

 

 



   [facility]









 Narrative

 

 



Performed at:[address] [place], [place] [id number]



: Prateek Kerr MD, Phone:[phone]









   



  Organism   Antibiotic   Method   Susceptibility

 

   



  Escherichia coli   Amoxicillin/Clavulanate    S ug/mL: Susceptible

 

   



  Escherichia coli   Ampicillin    R ug/mL: Resistant

 

   



  Escherichia coli   Cefepime    S ug/mL: Susceptible

 

   



  Escherichia coli   Ceftriaxone    S ug/mL: Susceptible

 

   



  Escherichia coli   Cefuroxime    S ug/mL: Susceptible

 

   



  Escherichia coli   Cephalothin    S ug/mL: Susceptible

 

   



  Escherichia coli   Ciprofloxacin    R ug/mL: Resistant

 

   



  Escherichia coli   Ertapenem    S ug/mL: Susceptible

 

   



  Escherichia coli   Gentamicin    S ug/mL: Susceptible

 

   



  Escherichia coli   Imipenem    S ug/mL: Susceptible

 

   



  Escherichia coli   Levofloxacin    R ug/mL: Resistant

 

   



  Escherichia coli   Nitrofurantoin    S ug/mL: Susceptible

 

   



  Escherichia coli   Piperacillin    R ug/mL: Resistant

 

   



  Escherichia coli   Tetracycline    S ug/mL: Susceptible

 

   



  Escherichia coli   Tobramycin    S ug/mL: Susceptible

 

   



  Escherichia coli   Trimethoprim/Sulfamethoxa    S ug/mL: Susceptible



   zole  

 

 



  Comment:



  Performed at:    [address] [place] [address]    [id number]



  : Prateek Kerr MD, Phone:    [phone]

 

   



  Enterococcus faecalis   Ciprofloxacin    S ug/mL: Susceptible

 

   



  Enterococcus faecalis   Levofloxacin    S ug/mL: Susceptible

 

   



  Enterococcus faecalis   Nitrofurantoin    S ug/mL: Susceptible

 

   



  Enterococcus faecalis   Penicillin    S ug/mL: Susceptible

 

   



  Enterococcus faecalis   Tetracycline    R ug/mL: Resistant

 

   



  Enterococcus faecalis   Vancomycin    S ug/mL: Susceptible

 

 



  Comment:



  Performed at:    [address] [place] [address]    [id number]



  : Prateek Kerr MD, Phone:    [phone]





* Test Code Change (2018  4:27 PM)





  



  Component   Value   Ref Range

 

  



  Test code change   Comment 



   Comment: 



   Please note that the Microbiology test code was 



   changed to reflect 



   the specimen source or transport received. 

 

  



  Fasting status   N 









 



  Specimen   Performing Laboratory

 

 



   [facility]









 Narrative

 

 



Performed at:[address] [place], [place] [id number]



: Prateek Kerr MD, Phone:[phone]





* Specimen Status Report (2018  4:27 PM)





  



  Component   Value   Ref Range

 

  



  Specimen Status Report   COMMENT 



   Comment: 



   Written Authorization 



   Written Authorization 



   Written Authorization Received. 



   Authorization received from [employer]MAR [employer]ANKS 2018 



   Logged by Loren Calixto 

 

  



  Fasting status   N 









 



  Specimen   Performing Laboratory

 

 



   [facility]









 Narrative

 

 



Performed at:[address] [place], [place] [id number]



: Prateek Kerr MD, Phone:[phone]





* Urine culture screen result (2018  4:27 PM)



Only the most recent of 2 results within the time period is included.





  



  Component   Value   Ref Range

 

  



  Urine culture   Mixed urogenital sachin 



   Greater than 100,000 colony forming units per mL 

 

  



  Fasting status   N 









 



  Specimen   Performing Laboratory

 

 



   [facility]









 Narrative

 

 



Performed at: - [id number]



: Prateek Kerr MD, Phone:[phone]





* Miscellaneous Imaging Result (2018)





 



  Specimen   Performing Laboratory

 

 



  Blood 





* POC urinalysis dipstick (2017  2:25 PM)



Only the most recent of 3 results within the time period is included.





  



  Component   Value   Ref Range

 

  



  Color urine, POC   Yellow 

 

  



  Clarity urine, POC   Clear 

 

  



  Glucose urine, POC   Negative   Negative

 

  



  Bilirubin urine, POC   Negative   Negative

 

  



  Ketones urine, POC   Negative   Negative

 

  



  Specific gravity urine,   1.025   1.005 - 1.030



  POC  

 

  



  Blood urine, POC   Negative   Negative

 

  



  pH urine, POC   5.5   5.0, 5.5, 6.0, 6.5, 7.0,



    7.5, 8.0, 8.5

 

  



  Protein urine, POC   Negative   Negative

 

  



  Urobilinogen urine, POC   <2.0   <2.0

 

  



  Nitrite urine, POC   Negative   Negative

 

  



  Leukocyte esterase urine,   Negative   Negative



  POC  









 



  Specimen   Performing Laboratory

 

 



  Urine 





* POC uroflowmetry (2017  3:40 PM)





  



  Component   Value   Ref Range

 

  



  Flow rate   215   ml/sec









 



  Specimen   Performing Laboratory

 

 



  Urine 









 Impressions

 

 



Peak Flow: 9 ml/s



Mean Flow; 7 ml/s



Voiding Time: 29 sec



Flow Time: 27 sec



Time to Max Flow: 7 sec



Voided VOlume: 215 ml





* US Renal (2017  3:18 PM)





 



  Specimen   Performing Laboratory

 

 



    [facility]



   [address] [place] [address]









 Narrative

 

 



Renal Ultrasound:



 



Bilateral renal Ultrasound is performed in Axial and longitudinal 



orientation.



Diagnosis:UTI



 



Right Kidney:



 



Size: Length: 10.1 cm. X AP diameter: 4.3 cm. X Transverse 4.4 cm.



Echogenicity: Normal



Atrophy: No



Cyst:No



Mass:No



Stone:No



Hydronephrosis:noneGrade:



 



 



 



 



Left Kidney:



 



Size: Length: 10.9 cm. X AP diameter: 4.9 cm. X Transverse 4.9 cm.



Echogenicity: Normal



Atrophy: No



Cyst:No



Mass:No



Stone:No



Hydronephrosis:none



Grade:



 



 



 



 



 



 



Comments:



Bilateral renal sonogram is unremarkable in this study. 





after 2017



Insurance







     



  Payer   Benefit   Subscriber ID   Type   Phone   Address



   Plan /    



   Group    

 

     



  MEDICARE   MEDICARE   xxxxxxxxxx   Medic[place]



   PART A AND    



   B    

 

     



  BCBS   BCBS   xxxxxxxxxxxx   PPO  



   CHOICE    



   PPO/[employer] PPO    









     



  Guarantor Name   Account   Relation to   Date of   Phone   Billing Address



   Type   Patient   Birth  

 

     



  MARIELA BISHOP   Personal/F   Self   1943   Home:   [address] [place] [address]     [phone]   [place] [phone]
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
